# Patient Record
Sex: FEMALE | Race: WHITE | NOT HISPANIC OR LATINO
[De-identification: names, ages, dates, MRNs, and addresses within clinical notes are randomized per-mention and may not be internally consistent; named-entity substitution may affect disease eponyms.]

---

## 2017-10-01 ENCOUNTER — HEALTH MAINTENANCE LETTER (OUTPATIENT)
Age: 32
End: 2017-10-01

## 2021-09-15 LAB — HIV 1&2 EXT: NORMAL

## 2021-09-17 LAB
HEP C HIM: NORMAL
HPV ABSTRACT: NORMAL
PAP-ABSTRACT: NORMAL

## 2023-02-16 ASSESSMENT — ENCOUNTER SYMPTOMS
CHILLS: 0
HEMATURIA: 0
JOINT SWELLING: 0
COUGH: 0
WEAKNESS: 0
FREQUENCY: 1
NERVOUS/ANXIOUS: 0
PARESTHESIAS: 0
MYALGIAS: 0
DYSURIA: 0
HEMATOCHEZIA: 0
CONSTIPATION: 0
ARTHRALGIAS: 0
DIZZINESS: 0
PALPITATIONS: 0
HEADACHES: 0
FEVER: 0
BREAST MASS: 0
SORE THROAT: 0
ABDOMINAL PAIN: 0
NAUSEA: 0
SHORTNESS OF BREATH: 0
HEARTBURN: 0
EYE PAIN: 0
DIARRHEA: 0

## 2023-02-16 ASSESSMENT — ANXIETY QUESTIONNAIRES
GAD7 TOTAL SCORE: 5
3. WORRYING TOO MUCH ABOUT DIFFERENT THINGS: MORE THAN HALF THE DAYS
7. FEELING AFRAID AS IF SOMETHING AWFUL MIGHT HAPPEN: NOT AT ALL
6. BECOMING EASILY ANNOYED OR IRRITABLE: NOT AT ALL
8. IF YOU CHECKED OFF ANY PROBLEMS, HOW DIFFICULT HAVE THESE MADE IT FOR YOU TO DO YOUR WORK, TAKE CARE OF THINGS AT HOME, OR GET ALONG WITH OTHER PEOPLE?: SOMEWHAT DIFFICULT
GAD7 TOTAL SCORE: 5
GAD7 TOTAL SCORE: 5
IF YOU CHECKED OFF ANY PROBLEMS ON THIS QUESTIONNAIRE, HOW DIFFICULT HAVE THESE PROBLEMS MADE IT FOR YOU TO DO YOUR WORK, TAKE CARE OF THINGS AT HOME, OR GET ALONG WITH OTHER PEOPLE: SOMEWHAT DIFFICULT
1. FEELING NERVOUS, ANXIOUS, OR ON EDGE: NOT AT ALL
7. FEELING AFRAID AS IF SOMETHING AWFUL MIGHT HAPPEN: NOT AT ALL
4. TROUBLE RELAXING: SEVERAL DAYS
2. NOT BEING ABLE TO STOP OR CONTROL WORRYING: MORE THAN HALF THE DAYS
5. BEING SO RESTLESS THAT IT IS HARD TO SIT STILL: NOT AT ALL

## 2023-02-16 ASSESSMENT — PATIENT HEALTH QUESTIONNAIRE - PHQ9
SUM OF ALL RESPONSES TO PHQ QUESTIONS 1-9: 6
10. IF YOU CHECKED OFF ANY PROBLEMS, HOW DIFFICULT HAVE THESE PROBLEMS MADE IT FOR YOU TO DO YOUR WORK, TAKE CARE OF THINGS AT HOME, OR GET ALONG WITH OTHER PEOPLE: SOMEWHAT DIFFICULT
SUM OF ALL RESPONSES TO PHQ QUESTIONS 1-9: 6

## 2023-02-16 NOTE — PATIENT INSTRUCTIONS
Good to see you today!    We will draw lab tests today. I will contact you in the next 2-3 business days with the results of these labs.    INCREASE sertraline to 75 mg (1.5 tablets) per day  Follow-up with me in 1 month for medication check (phone video OK)    Preventive Health Recommendations  Female Ages 26 - 39  Yearly exam:   See your health care provider every year in order to  Review health changes.   Discuss preventive care.    Review your medicines if you your doctor has prescribed any.    Until age 30: Get a Pap test every three years (more often if you have had an abnormal result).    After age 30: Talk to your doctor about whether you should have a Pap test every 3 years or have a Pap test with HPV screening every 5 years.   You do not need a Pap test if your uterus was removed (hysterectomy) and you have not had cancer.  You should be tested each year for STDs (sexually transmitted diseases), if you're at risk.   Talk to your provider about how often to have your cholesterol checked.  If you are at risk for diabetes, you should have a diabetes test (fasting glucose).  Shots: Get a flu shot each year. Get a tetanus shot every 10 years.   Nutrition:   Eat at least 5 servings of fruits and vegetables each day.  Eat whole-grain bread, whole-wheat pasta and brown rice instead of white grains and rice.  Get adequate Calcium and Vitamin D.     Lifestyle  Exercise at least 150 minutes a week (30 minutes a day, 5 days of the week). This will help you control your weight and prevent disease.  Limit alcohol to one drink per day.  No smoking.   Wear sunscreen to prevent skin cancer.  See your dentist every six months for an exam and cleaning.

## 2023-02-17 ENCOUNTER — OFFICE VISIT (OUTPATIENT)
Dept: FAMILY MEDICINE | Facility: CLINIC | Age: 38
End: 2023-02-17
Payer: COMMERCIAL

## 2023-02-17 VITALS
OXYGEN SATURATION: 97 % | TEMPERATURE: 97.9 F | BODY MASS INDEX: 30.05 KG/M2 | RESPIRATION RATE: 18 BRPM | HEART RATE: 100 BPM | DIASTOLIC BLOOD PRESSURE: 75 MMHG | WEIGHT: 176 LBS | SYSTOLIC BLOOD PRESSURE: 118 MMHG | HEIGHT: 64 IN

## 2023-02-17 DIAGNOSIS — Z13.88 SCREENING EXAMINATION FOR LEAD POISONING: ICD-10-CM

## 2023-02-17 DIAGNOSIS — Z00.00 ROUTINE GENERAL MEDICAL EXAMINATION AT A HEALTH CARE FACILITY: Primary | ICD-10-CM

## 2023-02-17 DIAGNOSIS — F41.8 POSTPARTUM ANXIETY: ICD-10-CM

## 2023-02-17 DIAGNOSIS — Z13.1 SCREENING FOR DIABETES MELLITUS: ICD-10-CM

## 2023-02-17 LAB — HBA1C MFR BLD: 4.9 % (ref 0–5.6)

## 2023-02-17 PROCEDURE — 83655 ASSAY OF LEAD: CPT | Performed by: FAMILY MEDICINE

## 2023-02-17 RX ORDER — FLUTICASONE PROPIONATE 50 MCG
2 SPRAY, SUSPENSION (ML) NASAL
COMMUNITY
Start: 2023-01-30 | End: 2023-03-17

## 2023-02-17 RX ORDER — CHLORAL HYDRATE 500 MG
2 CAPSULE ORAL DAILY
COMMUNITY

## 2023-02-17 NOTE — NURSING NOTE
"37 year old  Chief Complaint   Patient presents with     Physical     Recheck Medication       Blood pressure 118/75, pulse 100, temperature 97.9  F (36.6  C), temperature source Temporal, resp. rate 18, height 1.613 m (5' 3.5\"), weight 79.8 kg (176 lb), last menstrual period 01/27/2023, SpO2 97 %. Body mass index is 30.69 kg/m .  There is no problem list on file for this patient.      Wt Readings from Last 2 Encounters:   02/17/23 79.8 kg (176 lb)   01/23/13 62.1 kg (137 lb)     BP Readings from Last 3 Encounters:   02/17/23 118/75   01/23/13 118/77         Current Outpatient Medications   Medication     fluticasone (FLONASE) 50 MCG/ACT nasal spray     sertraline (ZOLOFT) 50 MG tablet     No current facility-administered medications for this visit.       Social History     Tobacco Use     Smoking status: Never   Substance Use Topics     Alcohol use: Yes     Comment:  3-6 glasses of beer per week.     Drug use: No       Health Maintenance Due   Topic Date Due     ADVANCE CARE PLANNING  Never done       No results found for: PAP      February 17, 2023 8:41 AM    "

## 2023-02-17 NOTE — PROGRESS NOTES
"CC: Physical and Recheck Medication      Michelle is a 37 year old female who presents to clinic for an annual exam.       New concerns:  Start of sertraline was pretty smooth  Previously took fluoxetine and had a lot of problems with starting this  Feels like it is helping a lot with the depressed feelings  Still having anxiety at times.  Would like to try increasing dose    Struggling a lot with body image following delivery \"this is the highest number I've ever seen\"  Working on body acceptance but acknowledges this is hard  Does feel that libido has been very low    Chronic health conditions:  Patient Active Problem List   Diagnosis     Postpartum anxiety       We reviewed and updated her medication list. Her past medical and surgical history as well as her family history were reviewed and updated as necessary.    Gynecological history:  Menstrual/PMS/menopausal symptoms: irregular (nursing)  Last Pap:  9/15/2021, result Normal  History of abnormal Paps: no  Concern for STIs: no  Safety: Feels safe in relationship  Would you like to become pregnant in the next year? no Contraceptive method: vasectomy  OB history:   Breast cancer screening: n/a    Other health-related habits:  Nutrition: varied and balanced  Physical activity: walking 3+ miles per day  Tobacco: None    Alcohol: Occassional  Drug use: no   Mood: see above  Dental: Due  Vaccines: UTD  Hep C & HIV screening: UTD  DM screening: ordered today  Lipids: deferred due to breastfeeding  Colon cancer screening: n/a (MGM > 60 years old at diagnosis, mom with normal screening. Plan for screening at 43 y/o)      OBJECTIVE:   /75 (BP Location: Left arm, Patient Position: Sitting, Cuff Size: Adult Large)   Pulse 100   Temp 97.9  F (36.6  C) (Temporal)   Resp 18   Ht 1.613 m (5' 3.5\")   Wt 79.8 kg (176 lb)   LMP 01/27/2023 (Approximate)   SpO2 97%   BMI 30.69 kg/m     General: Healthy female in NAD.  Cooperative and pleasant.  HEENT: " Normocephalic, atraumatic. Oropharynx moist without lesions or exudate.  Supple neck, without lymphadenopathy or thyromegaly.    Lungs: CTA bilaterally.  CV: RRR, normal S1 and S2, without murmurs, rubs, or gallops appreciated.    Abdomen: Soft, NT, ND.  No masses or hepatosplenomegaly appreciated.    Extremities: WWW, without deformity, edema.  Skin: Clear without lesions or rashes.   Neuro: Grossly intact, nonfocal.  Psych: Mood and affect appropriate.      ASSESSMENT AND PLAN:   Michelle was seen today for physical and recheck medication.    Diagnoses and all orders for this visit:    Routine general medical examination at a health care facility    Screening for diabetes mellitus  -     Hemoglobin A1c; Future    Screening examination for lead poisoning  -     Lead Venous Blood Confirm; Future    Postpartum anxiety  -     sertraline (ZOLOFT) 50 MG tablet; Take 1.5 tablets (75 mg) by mouth daily      Increase sertraline to 75 mg daily    Follow-up: 1 month medication check    Sherine Villarreal MD/[facility]  Family Medicine

## 2023-02-19 LAB — LEAD BLDV-MCNC: <2 UG/DL

## 2023-03-16 ASSESSMENT — ANXIETY QUESTIONNAIRES
GAD7 TOTAL SCORE: 3
2. NOT BEING ABLE TO STOP OR CONTROL WORRYING: SEVERAL DAYS
8. IF YOU CHECKED OFF ANY PROBLEMS, HOW DIFFICULT HAVE THESE MADE IT FOR YOU TO DO YOUR WORK, TAKE CARE OF THINGS AT HOME, OR GET ALONG WITH OTHER PEOPLE?: SOMEWHAT DIFFICULT
7. FEELING AFRAID AS IF SOMETHING AWFUL MIGHT HAPPEN: NOT AT ALL
4. TROUBLE RELAXING: NOT AT ALL
6. BECOMING EASILY ANNOYED OR IRRITABLE: NOT AT ALL
3. WORRYING TOO MUCH ABOUT DIFFERENT THINGS: SEVERAL DAYS
7. FEELING AFRAID AS IF SOMETHING AWFUL MIGHT HAPPEN: NOT AT ALL
GAD7 TOTAL SCORE: 3
IF YOU CHECKED OFF ANY PROBLEMS ON THIS QUESTIONNAIRE, HOW DIFFICULT HAVE THESE PROBLEMS MADE IT FOR YOU TO DO YOUR WORK, TAKE CARE OF THINGS AT HOME, OR GET ALONG WITH OTHER PEOPLE: SOMEWHAT DIFFICULT
GAD7 TOTAL SCORE: 3
5. BEING SO RESTLESS THAT IT IS HARD TO SIT STILL: NOT AT ALL
1. FEELING NERVOUS, ANXIOUS, OR ON EDGE: SEVERAL DAYS

## 2023-03-16 ASSESSMENT — PATIENT HEALTH QUESTIONNAIRE - PHQ9
SUM OF ALL RESPONSES TO PHQ QUESTIONS 1-9: 3
SUM OF ALL RESPONSES TO PHQ QUESTIONS 1-9: 3
10. IF YOU CHECKED OFF ANY PROBLEMS, HOW DIFFICULT HAVE THESE PROBLEMS MADE IT FOR YOU TO DO YOUR WORK, TAKE CARE OF THINGS AT HOME, OR GET ALONG WITH OTHER PEOPLE: SOMEWHAT DIFFICULT

## 2023-03-17 ENCOUNTER — VIRTUAL VISIT (OUTPATIENT)
Dept: FAMILY MEDICINE | Facility: CLINIC | Age: 38
End: 2023-03-17
Payer: COMMERCIAL

## 2023-03-17 DIAGNOSIS — R68.82 LOW LIBIDO: ICD-10-CM

## 2023-03-17 DIAGNOSIS — F41.8 POSTPARTUM ANXIETY: Primary | ICD-10-CM

## 2023-03-17 NOTE — NURSING NOTE
37 year old  Chief Complaint   Patient presents with     Recheck Medication     Sertraline        Last menstrual period 2023. There is no height or weight on file to calculate BMI.  Patient Active Problem List   Diagnosis     Postpartum anxiety       Wt Readings from Last 2 Encounters:   23 79.8 kg (176 lb)   13 62.1 kg (137 lb)     BP Readings from Last 3 Encounters:   23 118/75   13 118/77         Current Outpatient Medications   Medication     fish oil-omega-3 fatty acids 1000 MG capsule     Prenatal Multivit-Min-Fe-FA (JENLIVA PRENATAL/ PO)     sertraline (ZOLOFT) 50 MG tablet     fluticasone (FLONASE) 50 MCG/ACT nasal spray     No current facility-administered medications for this visit.       Social History     Tobacco Use     Smoking status: Never     Smokeless tobacco: Never   Substance Use Topics     Alcohol use: Yes     Comment:  3-6 glasses of beer per week.     Drug use: No       Health Maintenance Due   Topic Date Due     ADVANCE CARE PLANNING  Never done       No results found for: PAP      2023 9:09 AM

## 2023-03-17 NOTE — PROGRESS NOTES
"Michelle is a 37 year old who is being evaluated via a billable video visit.      How would you like to obtain your AVS? MyChart  If the video visit is dropped, the invitation should be resent by: Text to cell phone: [phone]  Will anyone else be joining your video visit? No          Assessment & Plan     Postpartum anxiety  Continue sertraline 75 mg  Follow-up in 6 months for medication check, sooner with concerns    Low libido  Consider [facility] consult  - [facility]  Referral; Future    Sherine Villarreal MD  [facility]    Subjective   Michelle is a 37 year old, presenting for the following health issues:  Recheck Medication (Sertraline )    Increased sertraline from 50 mg to 75 mg at our last visit  Feels like it has been really helpful  Feels like her worries or concerns are more \"normal\" - (e.g. does Mac need to drop a nap?)  No side effects with the dose change  No significant sadness/depression  Has found when she does have things that upset her, she is able to process this information much more quickly and easily than she has in the past    Is working with therapist, doing couples counseling with 's therapist  May be interested in doing therapy specifically surrounding sexual health in the future given persistent low libido since delivery.      PHQ 2/16/2023 3/16/2023   PHQ-9 Total Score 6 3   Q9: Thoughts of better off dead/self-harm past 2 weeks Not at all Not at all     CODY-7 SCORE 2/16/2023 3/16/2023   Total Score 5 (mild anxiety) 3 (minimal anxiety)   Total Score 5 3           Objective           Vitals:  No vitals were obtained today due to virtual visit.    Physical Exam   GENERAL: Healthy, alert and no distress  EYES: Eyes grossly normal to inspection.  No discharge or erythema, or obvious scleral/conjunctival abnormalities.  RESP: No audible wheeze, cough, or visible cyanosis.  No visible retractions or increased work of breathing.    SKIN: Visible " skin clear. No significant rash, abnormal pigmentation or lesions.  NEURO: Cranial nerves grossly intact.  Mentation and speech appropriate for age.  PSYCH: Mentation appears normal, affect normal/bright, judgement and insight intact, normal speech and appearance well-groomed.        Video-Visit Details    Type of service:  Video Visit     Originating Location (pt. Location): Home  Distant Location (provider location):  On-site  Platform used for Video Visit: Acustream    Answers for HPI/ROS submitted by the patient on 3/16/2023  If you checked off any problems, how difficult have these problems made it for you to do your work, take care of things at home, or get along with other people?: Somewhat difficult  PHQ9 TOTAL SCORE: 3  CODY 7 TOTAL SCORE: 3

## 2023-03-17 NOTE — PATIENT INSTRUCTIONS
Good to see you today!    Continue sertraline 75 mg per day. Follow-up with me in 6 months for medication check, sooner with concerns.    Look into the [facility] for therapy/counseling services specifically related to sexual health and low libido. It is run through the [facility] ([email or web address])

## 2023-03-30 ENCOUNTER — OFFICE VISIT (OUTPATIENT)
Dept: FAMILY MEDICINE | Facility: CLINIC | Age: 38
End: 2023-03-30
Payer: COMMERCIAL

## 2023-03-30 VITALS
TEMPERATURE: 98.3 F | RESPIRATION RATE: 18 BRPM | HEART RATE: 112 BPM | SYSTOLIC BLOOD PRESSURE: 142 MMHG | OXYGEN SATURATION: 97 % | DIASTOLIC BLOOD PRESSURE: 88 MMHG

## 2023-03-30 DIAGNOSIS — L30.8 OTHER ECZEMA: Primary | ICD-10-CM

## 2023-03-30 RX ORDER — TACROLIMUS 1 MG/G
OINTMENT TOPICAL 2 TIMES DAILY
Qty: 30 G | Refills: 1 | Status: SHIPPED | OUTPATIENT
Start: 2023-03-30 | End: 2023-06-29 | Stop reason: SINTOL

## 2023-03-30 NOTE — NURSING NOTE
37 year old  Chief Complaint   Patient presents with     Derm Problem     Itchy spots around eyes bilaterally -- under bottom lash line and to edges of eyes. Has been using shea butter, eucerin eczema cream made it worse. Spots started showing around video visit 3/17, have shown up before in the past and goes away. Worst within an hour of waking up and will be bad all day, itching and burning.       Blood pressure (!) 142/88, pulse 112, temperature 98.3  F (36.8  C), temperature source Temporal, resp. rate 18, last menstrual period 2023, SpO2 97 %. There is no height or weight on file to calculate BMI.  Patient Active Problem List   Diagnosis     Postpartum anxiety       Wt Readings from Last 2 Encounters:   23 79.8 kg (176 lb)   13 62.1 kg (137 lb)     BP Readings from Last 3 Encounters:   23 (!) 142/88   23 118/75   13 118/77         Current Outpatient Medications   Medication     fish oil-omega-3 fatty acids 1000 MG capsule     Prenatal Multivit-Min-Fe-FA (JENLIVA PRENATAL/ PO)     sertraline (ZOLOFT) 50 MG tablet     No current facility-administered medications for this visit.       Social History     Tobacco Use     Smoking status: Never     Smokeless tobacco: Never   Substance Use Topics     Alcohol use: Yes     Comment:  3-6 glasses of beer per week.     Drug use: No       Health Maintenance Due   Topic Date Due     ADVANCE CARE PLANNING  Never done       No results found for: PAP      2023 9:57 AM

## 2023-03-30 NOTE — PROGRESS NOTES
CC: Derm Problem (Itchy spots around eyes bilaterally -- under bottom lash line and to edges of eyes. Has been using shea butter, eucerin eczema cream made it worse. Spots started showing around video visit 3/17, have shown up before in the past and goes away. Worst within an hour of waking up and will be bad all day, itching and burning.)        ASSESSMENT/PLAN: 36 y/o F with a history of intermittent eczema-like rash presenting today with periocular eczema. EOMI and no evidence of cellulitis. Will treat with tacrolimus + antihistamines, return precautions reviewed. Discussed avoidance of triggers. Patient in agreement with plan.    Michelle was seen today for derm problem.    Diagnoses and all orders for this visit:    Other eczema  -     tacrolimus (PROTOPIC) 0.1 % external ointment; Apply topically 2 times daily        Worrisome signs and symptoms were discussed with Michelle and she was instructed to return to the clinic for concerning symptoms or to call with questions. All patient questions answered.    Follow up: NICHOLAS Villarreal MD/[facility]  Family Medicine      SUBJECTIVE: Michelle is a 37 year old female who comes in with the following concerns:    Rash around eyelids  Started about 2 weeks ago  No new face creams, eye lotions, detergents  Seems to be better first thing in the morning (after lotion on and no scratching all night). Gets worse within the first hour of waking her up and bothersome all day.    Has had similar before, usually improves with lotion and is on R lower lid only. This time affecting R upper and lower lid and L lower lid and not responding to lotion.    Using Cid's shea butter which seems to help some. Eucerin eczema cream was too irritating/made it worse.    Gets rare, sporadic eczema otherwise (occasionally some on her hip, resolves with lotion)  Does have seasonal allergies, usually manages but eating local honey daily. Has not started this for this season yet.    No changes in  visual acuity    OBJECTIVE:   BP (!) 142/88 (BP Location: Left arm, Patient Position: Sitting, Cuff Size: Adult Large)   Pulse 112   Temp 98.3  F (36.8  C) (Temporal)   Resp 18   LMP 01/27/2023 (Approximate)   SpO2 97%   General: Well-appearing in NAD   HEENT: Normocephalic, atraumatic. Mucus membranes moist. EOMI without pain   Resp: No respiratory distress   MSK: No peripheral edema.   Skin: Papules present on R upper and lower lid, L lower lid. No significant scale/plaques and no erythema/induration/warmth. Slight edema to lower lids bilaterally  Psych: Appropriate affect. Mood is good

## 2023-03-30 NOTE — PATIENT INSTRUCTIONS
Use tacrolimus ointment twice daily (morning and night) - prescription   Use for up to 2 weeks and then as needed.    Use shea butter ointment for times when additional moisture is needed    Avoid wiping, rubbing and washing more than once per day. Use gentle soaps (or no soap) if at all.    Would also add in allergy treatment, either restarting honey or oral antihistamine for the next 2-3 weeks.

## 2023-06-29 ENCOUNTER — OFFICE VISIT (OUTPATIENT)
Dept: FAMILY MEDICINE | Facility: CLINIC | Age: 38
End: 2023-06-29
Payer: COMMERCIAL

## 2023-06-29 VITALS
HEART RATE: 78 BPM | SYSTOLIC BLOOD PRESSURE: 114 MMHG | TEMPERATURE: 97.8 F | DIASTOLIC BLOOD PRESSURE: 76 MMHG | OXYGEN SATURATION: 96 %

## 2023-06-29 DIAGNOSIS — L21.9 SEBORRHEIC DERMATITIS: Primary | ICD-10-CM

## 2023-06-29 RX ORDER — KETOCONAZOLE 20 MG/ML
SHAMPOO TOPICAL DAILY PRN
Qty: 120 ML | Refills: 0 | Status: SHIPPED | OUTPATIENT
Start: 2023-06-29 | End: 2024-02-13

## 2023-06-29 RX ORDER — DESONIDE 0.5 MG/G
CREAM TOPICAL DAILY PRN
Qty: 15 G | Refills: 0 | Status: SHIPPED | OUTPATIENT
Start: 2023-06-29

## 2023-06-29 NOTE — PROGRESS NOTES
CC: Derm Problem (Pt reports a continuous rash on both her eyelids)      Subjective: Michelle Vivas is a 38 year old female who presents for evaluation of a rash on both eyelids.  She notes inflamed bumpy spots on eyelids, comes and goes. Started on the right eye on the eyelid and now both eyes and into the eyebrows and forehead. Pruritic. Since March. Saw Dr. Villarreal, started on tacrolimus topical, but she stopped tacrolimus because it was hurting. Tried it for 1 month. Burning when she used it.     On the face she is using mineral sunscreen. And palmers cocoa butter formula. No eye makeup. No new face creams, eye lotions, detergents.     Gets rare, sporadic eczema otherwise. No scalp itchiness or rash.       Objective:  /76   Pulse 78   Temp 97.8  F (36.6  C)   SpO2 96%   General: Patient appears healthy, alert, and in no distress.  Skin: Small papules on right upper lid, some scale along bilateral eyebrows. No erythema, induration, warmth, or edema.     Assessment:  1) Rash, possible seborrheic dermatitis vs eczema    Plan:   1) Patient was provided with prescription of topical ketoconazole shampoo to be applied to affected areas twice weekly. Also suggested topical desonide as needed for days with flare of symptoms. Dosing and possible adverse effects of medication discussed. Discussed limited use of topical steroid d/t adverse effects. RTC prn if no improvement with above.

## 2023-06-29 NOTE — NURSING NOTE
38 year old  Chief Complaint   Patient presents with     Derm Problem     Pt reports a continuous rash on both her eyelids       Blood pressure 114/76, pulse 78, temperature 97.8  F (36.6  C), SpO2 96 %. There is no height or weight on file to calculate BMI.  Patient Active Problem List   Diagnosis     Postpartum anxiety       Wt Readings from Last 2 Encounters:   23 79.8 kg (176 lb)   13 62.1 kg (137 lb)     BP Readings from Last 3 Encounters:   23 114/76   23 (!) 142/88   23 118/75         Current Outpatient Medications   Medication     fish oil-omega-3 fatty acids 1000 MG capsule     Prenatal Multivit-Min-Fe-FA (JENLIVA PRENATAL/ PO)     sertraline (ZOLOFT) 50 MG tablet     tacrolimus (PROTOPIC) 0.1 % external ointment     No current facility-administered medications for this visit.       Social History     Tobacco Use     Smoking status: Never     Smokeless tobacco: Never   Substance Use Topics     Alcohol use: Yes     Comment:  3-6 glasses of beer per week.     Drug use: No       Health Maintenance Due   Topic Date Due     ADVANCE CARE PLANNING  Never done       No results found for: PAP      2023 10:02 AM

## 2023-09-01 ENCOUNTER — OFFICE VISIT (OUTPATIENT)
Dept: FAMILY MEDICINE | Facility: CLINIC | Age: 38
End: 2023-09-01
Payer: COMMERCIAL

## 2023-09-01 VITALS
OXYGEN SATURATION: 99 % | HEART RATE: 84 BPM | RESPIRATION RATE: 17 BRPM | TEMPERATURE: 97.1 F | BODY MASS INDEX: 32.52 KG/M2 | WEIGHT: 186.5 LBS | DIASTOLIC BLOOD PRESSURE: 80 MMHG | SYSTOLIC BLOOD PRESSURE: 115 MMHG

## 2023-09-01 DIAGNOSIS — F41.8 POSTPARTUM ANXIETY: ICD-10-CM

## 2023-09-01 DIAGNOSIS — R68.89 HEAT INTOLERANCE: Primary | ICD-10-CM

## 2023-09-01 LAB
HBA1C MFR BLD: 5.1 % (ref 0–5.6)
TSH SERPL DL<=0.005 MIU/L-ACNC: 1.69 UIU/ML (ref 0.3–4.2)

## 2023-09-01 PROCEDURE — 84443 ASSAY THYROID STIM HORMONE: CPT | Performed by: FAMILY MEDICINE

## 2023-09-01 ASSESSMENT — ANXIETY QUESTIONNAIRES
5. BEING SO RESTLESS THAT IT IS HARD TO SIT STILL: NOT AT ALL
1. FEELING NERVOUS, ANXIOUS, OR ON EDGE: SEVERAL DAYS
GAD7 TOTAL SCORE: 6
7. FEELING AFRAID AS IF SOMETHING AWFUL MIGHT HAPPEN: NOT AT ALL
IF YOU CHECKED OFF ANY PROBLEMS ON THIS QUESTIONNAIRE, HOW DIFFICULT HAVE THESE PROBLEMS MADE IT FOR YOU TO DO YOUR WORK, TAKE CARE OF THINGS AT HOME, OR GET ALONG WITH OTHER PEOPLE: NOT DIFFICULT AT ALL
GAD7 TOTAL SCORE: 6
2. NOT BEING ABLE TO STOP OR CONTROL WORRYING: MORE THAN HALF THE DAYS
6. BECOMING EASILY ANNOYED OR IRRITABLE: NOT AT ALL
3. WORRYING TOO MUCH ABOUT DIFFERENT THINGS: MORE THAN HALF THE DAYS

## 2023-09-01 ASSESSMENT — PATIENT HEALTH QUESTIONNAIRE - PHQ9
5. POOR APPETITE OR OVEREATING: SEVERAL DAYS
SUM OF ALL RESPONSES TO PHQ QUESTIONS 1-9: 4

## 2023-09-01 NOTE — PROGRESS NOTES
CC: Recheck Medication and Follow Up (Thyroid/hormone/diabetes testing. Family hx of diabetes, and fam hx of thyroid issues.)        ASSESSMENT/PLAN: 38 year old female with postpartum depression/anxiety. Also reporting heat intolerance and increased sweating. Will check TSH, A1c today. Hyperhidrosis is potential side effect of selective serotonin reuptake inhibitor and this was reviewed with patient today. She has already decreased her dose to 50 mg per day. Will continue to monitor.    Michelle was seen today for recheck medication and follow up.    Diagnoses and all orders for this visit:    Heat intolerance  -     Hemoglobin A1c; Future  -     TSH with free T4 reflex; Future  -     Hemoglobin A1c  -     TSH with free T4 reflex    Postpartum anxiety  -     sertraline (ZOLOFT) 50 MG tablet; Take 1 tablet (50 mg) by mouth daily        Worrisome signs and symptoms were discussed with Michelle and she was instructed to return to the clinic for concerning symptoms or to call with questions. All patient questions answered.    Follow up: 6 months    Sherine Villarreal MD/[facility]  Family Medicine      SUBJECTIVE: Michelle is a 38 year old female who comes in with the following concerns:    Noticing heat intolerance  She has always felt like she has run on the warm side, this has been more pronounced since having her child.  Noticing she is sweating significantly with any minimal activity, sometimes after eating as well  Wondering about hormonal cause  Has been working to wean - currently only nursing once per day, but not every day. Does breast sleep at night    Decreased sertraline from 75 mg to 50 mg  Forgot it when she went on a trip, went back to 50 mg and has been there for 1-1.5 weeks  Overall feels like anxiety continues to be stable  Most stress is with her relationship right now. Seeing therapy, reaching out to friends    Had a milk bleb about a week ago, was able to get it released by feeding. Still having occasional deep,  shooting pain in that breast. No residual nipple pain/trauma      OBJECTIVE:   /80 (BP Location: Left arm, Patient Position: Sitting, Cuff Size: Adult Large)   Pulse 84   Temp 97.1  F (36.2  C) (Temporal)   Resp 17   Wt 84.6 kg (186 lb 8 oz)   SpO2 99%   BMI 32.52 kg/m    General: Well-appearing in NAD   HEENT: Normocephalic, atraumatic. Mucus membranes moist.   Resp: No respiratory distress   MSK: No peripheral edema.   Skin: No rashes or lesions noted.   Psych: Appropriate affect.

## 2023-09-01 NOTE — NURSING NOTE
38 year old  Chief Complaint   Patient presents with    Recheck Medication    Follow Up     Thyroid/hormone/diabetes testing. Family hx of diabetes, and fam hx of thyroid issues.       Blood pressure 115/80, pulse 84, temperature 97.1  F (36.2  C), temperature source Temporal, resp. rate 17, weight 84.6 kg (186 lb 8 oz), SpO2 99 %. Body mass index is 32.52 kg/m .  Patient Active Problem List   Diagnosis    Postpartum anxiety       Wt Readings from Last 2 Encounters:   23 84.6 kg (186 lb 8 oz)   23 79.8 kg (176 lb)     BP Readings from Last 3 Encounters:   23 115/80   23 114/76   23 (!) 142/88         Current Outpatient Medications   Medication    desonide (DESOWEN) 0.05 % external cream    fish oil-omega-3 fatty acids 1000 MG capsule    ketoconazole (NIZORAL) 2 % external shampoo    Prenatal Multivit-Min-Fe-FA (JENLIVA PRENATAL/ PO)    sertraline (ZOLOFT) 50 MG tablet     No current facility-administered medications for this visit.       Social History     Tobacco Use    Smoking status: Never    Smokeless tobacco: Never   Substance Use Topics    Alcohol use: Yes     Comment:  3-6 glasses of beer per week.    Drug use: No       Health Maintenance Due   Topic Date Due    ADVANCE CARE PLANNING  Never done    INFLUENZA VACCINE (1) 2023       No results found for: PAP      2023 9:38 AM

## 2023-09-01 NOTE — PATIENT INSTRUCTIONS
Continue sertraline 50 mg per day  Let me know if anxiety is worsening.    We will draw lab tests today. I will contact you in the next 2-3 business days with the results of these labs.     Resources for Adult Autism Disorder testing    -[facility]    -[facility]    -[facility] Adult [facility] Referral--under this order you can select psychological evaluation and then clarify, seeking Adult Autism Spectrum Disorder Testing.

## 2023-11-01 ENCOUNTER — MYC MEDICAL ADVICE (OUTPATIENT)
Dept: FAMILY MEDICINE | Facility: CLINIC | Age: 38
End: 2023-11-01

## 2023-11-07 ENCOUNTER — ALLIED HEALTH/NURSE VISIT (OUTPATIENT)
Dept: FAMILY MEDICINE | Facility: CLINIC | Age: 38
End: 2023-11-07
Payer: COMMERCIAL

## 2023-11-07 DIAGNOSIS — Z23 NEEDS FLU SHOT: Primary | ICD-10-CM

## 2023-11-07 NOTE — PROGRESS NOTES
"  Injectable Influenza Immunization Documentation    1.  Has the patient received the information for the injectable influenza vaccine? {YES/NO (DEFAULT IS YES):683042::YES}     2. Is the patient 6 months of age or older? {YES/NO (DEFAULT IS YES):860814::YES}     3. Does the patient have any of the following contraindications?         Severe allergy to eggs? {YES/NO DEFAULT NO:51196:: No}     Severe allergic reaction to previous influenza vaccines? {YES/NO DEFAULT     NO:40154:: No}   Severe allergy to latex? {YES/NO DEFAULT NO:84514:: No}       History of Guillain-[place] syndrome? {YES/NO DEFAULT NO:49643:: No}     Currently have a temperature greater than 100.4F? {YES/NO DEFAULT NO:14296:: No}        4.  Severely egg allergic patients should have flu vaccine eligibility assessed by an MD, RN, or pharmacist, and those who received flu vaccine should be observed for 15 min by an MD, RN, Pharmacist, Medical Technician, or member of clinic staff.\": {YES/NO (DEFAULT IS YES):602195::YES}    5. Latex-allergic patients should be given latex-free influenza vaccine {Yes/No:847587}. Please reference the Vaccine latex table to determine if your clinic s product is latex-containing.       Vaccination given by ***      "

## 2023-11-07 NOTE — NURSING NOTE
"Injectable Influenza Immunization Documentation    1.  Has the patient received the information for the injectable influenza vaccine? YES     2. Is the patient 6 months of age or older? YES     3. Does the patient have any of the following contraindications?         Severe allergy to eggs? No     Severe allergic reaction to previous influenza vaccines? No   Severe allergy to latex? No       History of Guillain-[place] syndrome? No     Currently have a temperature greater than 100.4F? No        4.  Severely egg allergic patients should have flu vaccine eligibility assessed by an MD, RN, or pharmacist, and those who received flu vaccine should be observed for 15 min by an MD, RN, Pharmacist, Medical Technician, or member of clinic staff.\": YES    5. Latex-allergic patients should be given latex-free influenza vaccine Yes. Please reference the Vaccine latex table to determine if your clinic s product is latex-containing.       Vaccination given by Bea Rose CMA on 11/7/2023 at 10:04 AM      "

## 2024-02-13 ENCOUNTER — OFFICE VISIT (OUTPATIENT)
Dept: FAMILY MEDICINE | Facility: CLINIC | Age: 39
End: 2024-02-13
Payer: COMMERCIAL

## 2024-02-13 VITALS
OXYGEN SATURATION: 96 % | HEART RATE: 96 BPM | TEMPERATURE: 98.3 F | DIASTOLIC BLOOD PRESSURE: 78 MMHG | SYSTOLIC BLOOD PRESSURE: 127 MMHG

## 2024-02-13 DIAGNOSIS — K64.4 EXTERNAL HEMORRHOIDS: ICD-10-CM

## 2024-02-13 DIAGNOSIS — K60.2 ANAL FISSURE: Primary | ICD-10-CM

## 2024-02-13 NOTE — PROGRESS NOTES
CC: Rectal Problem (Hemorrhoid discussion)      ASSESSMENT/PLAN: 37 y/o F presenting with rectal pain with bowel movements. Exam today with external hemorrhoid and anal fissure. Prescribe topical nifedipine ointment to compounding pharmacy. Discussed supportive cares. If no improvement, would recommend evaluation by colorectal surgery. Patient in agreement with plan.    Michelle was seen today for rectal problem.    Diagnoses and all orders for this visit:    Anal fissure  -     nifedipine 0.2% in white petrolatum 0.2 % OINT ointment; Apply topically 2 times daily    External hemorrhoids        Worrisome signs and symptoms were discussed with Michelle and she was instructed to return to the clinic for concerning symptoms or to call with questions. All patient questions answered.    Follow up: 1-2 months for physical    Sherine Villarreal MD/MPH  Family Medicine      SUBJECTIVE: Michelle is a 38 year old female who comes in with the following concerns:    Hemorrhoids. Usually starts with period  Did not have during pregnancy  Did not have right away postpartum, but have had since menstruating    External, able to feel them  +pain, usually not itchy  Heals overnights - then gets worse after a bowel movement    Using Tucks, and using a spray  Preparation H sprays  Applying 1-2 times per day.    Aching, discomfort. No sharp/severe pain    Bowel movements - stools do get a little looser with periods  Has pain with bowel movements. Doesn't want to have a bowel movement due to the pain  Has a high fiber diet  Usually regular, no straining, no difficulty with hard stools.    Rarely bleeds  Using a bidet after using the bathroom    OBJECTIVE:   /78 (BP Location: Left arm, Patient Position: Sitting, Cuff Size: Adult Regular)   Pulse 96   Temp 98.3  F (36.8  C) (Skin)   LMP 01/24/2024 (Approximate)   SpO2 96%   General: Well-appearing in NAD   HEENT: Normocephalic, atraumatic. Mucus membranes moist.   Resp: No respiratory  distress   MSK: No peripheral edema.   Skin: No rashes or lesions noted.   : [id number] non-thrombosed external hemorrhoid present at 6:00 position. There is also an anal fissure at 12:00 position.  Psych: Appropriate affect. Mood is good

## 2024-02-13 NOTE — PATIENT INSTRUCTIONS
Apply nifedipine ointment rectally up to two times daily prior to bowel movements.    Bidet/sitz baths after bowel movements.    Use Tucks/preparahation H during the day.    Follow=up if no improvement or worsening.

## 2024-02-24 DIAGNOSIS — F41.8 POSTPARTUM ANXIETY: ICD-10-CM

## 2024-02-26 NOTE — TELEPHONE ENCOUNTER
Medication requested: sertraline (ZOLOFT) 50 MG tablet   Last office visit: 2/13/24  [facility] appointments: none  Medication last refilled: 9/1/23; 90 + 1 refill  Last qualifying labs:    9/1/2023  9:42 AM   PHQ-9 / CODY-7 Scores 8/2015 to present    CODY-7 Score DocFlow 6       9/1/2023  9:42 AM   PHQ-9 / CODY-7 Scores 8/2015 to present    PHQ-9 Score DocFlow 4      Prescription approved per [facility] Refill Protocol.    Devan GARCIA, RN  02/26/24 3:52 PM

## 2024-04-20 ENCOUNTER — HEALTH MAINTENANCE LETTER (OUTPATIENT)
Age: 39
End: 2024-04-20

## 2024-04-29 SDOH — HEALTH STABILITY: PHYSICAL HEALTH: ON AVERAGE, HOW MANY MINUTES DO YOU ENGAGE IN EXERCISE AT THIS LEVEL?: 30 MIN

## 2024-04-29 SDOH — HEALTH STABILITY: PHYSICAL HEALTH: ON AVERAGE, HOW MANY DAYS PER WEEK DO YOU ENGAGE IN MODERATE TO STRENUOUS EXERCISE (LIKE A BRISK WALK)?: 3 DAYS

## 2024-04-29 ASSESSMENT — SOCIAL DETERMINANTS OF HEALTH (SDOH): HOW OFTEN DO YOU GET TOGETHER WITH FRIENDS OR RELATIVES?: ONCE A WEEK

## 2024-05-03 ENCOUNTER — OFFICE VISIT (OUTPATIENT)
Dept: FAMILY MEDICINE | Facility: CLINIC | Age: 39
End: 2024-05-03
Payer: COMMERCIAL

## 2024-05-03 VITALS
HEART RATE: 91 BPM | BODY MASS INDEX: 32.61 KG/M2 | HEIGHT: 64 IN | OXYGEN SATURATION: 97 % | RESPIRATION RATE: 16 BRPM | WEIGHT: 191 LBS | SYSTOLIC BLOOD PRESSURE: 116 MMHG | TEMPERATURE: 98 F | DIASTOLIC BLOOD PRESSURE: 82 MMHG

## 2024-05-03 DIAGNOSIS — Z12.31 ENCOUNTER FOR SCREENING MAMMOGRAM FOR MALIGNANT NEOPLASM OF BREAST: ICD-10-CM

## 2024-05-03 DIAGNOSIS — Z00.00 ROUTINE GENERAL MEDICAL EXAMINATION AT A HEALTH CARE FACILITY: Primary | ICD-10-CM

## 2024-05-03 DIAGNOSIS — M79.674 PAIN OF TOE OF RIGHT FOOT: ICD-10-CM

## 2024-05-03 DIAGNOSIS — F41.8 POSTPARTUM ANXIETY: ICD-10-CM

## 2024-05-03 ASSESSMENT — ANXIETY QUESTIONNAIRES
1. FEELING NERVOUS, ANXIOUS, OR ON EDGE: NOT AT ALL
GAD7 TOTAL SCORE: 5
7. FEELING AFRAID AS IF SOMETHING AWFUL MIGHT HAPPEN: MORE THAN HALF THE DAYS
2. NOT BEING ABLE TO STOP OR CONTROL WORRYING: SEVERAL DAYS
3. WORRYING TOO MUCH ABOUT DIFFERENT THINGS: SEVERAL DAYS
GAD7 TOTAL SCORE: 5
6. BECOMING EASILY ANNOYED OR IRRITABLE: SEVERAL DAYS
5. BEING SO RESTLESS THAT IT IS HARD TO SIT STILL: NOT AT ALL

## 2024-05-03 ASSESSMENT — PATIENT HEALTH QUESTIONNAIRE - PHQ9
SUM OF ALL RESPONSES TO PHQ QUESTIONS 1-9: 5
5. POOR APPETITE OR OVEREATING: NOT AT ALL

## 2024-05-03 NOTE — PATIENT INSTRUCTIONS
Good to see you today!    Sertraline refill at your pharmacy.    [facility]ing [facility] - scar mobilization and sensitivity resources  [facility] - home based PT - could include scar mobilization with this ([email or web address]    [facility]  [address] [place] [address]  [phone]    Lab/X-ray hours:  M-F 7:00AM - 7:00PM  Sat 9:00AM - 1:00PM  Sun - Closed   Preventive Care Advice   This is general advice given by our system to help you stay healthy. However, your care team may have specific advice just for you. Please talk to your care team about your preventive care needs.  Nutrition  Eat 5 or more servings of fruits and vegetables each day.  Try wheat bread, brown rice and whole grain pasta (instead of white bread, rice, and pasta).  Get enough calcium and vitamin D. Check the label on foods and aim for 100% of the RDA (recommended daily allowance).  Lifestyle  Exercise at least 150 minutes each week   (30 minutes a day, 5 days a week).  Do muscle strengthening activities 2 days a week. These help control your weight and prevent disease.  No smoking.  Wear sunscreen to prevent skin cancer.  Have a dental exam and cleaning every 6 months.  Yearly exams  See your health care team every year to talk about:  Any changes in your health.  Any medicines your care team has prescribed.  Preventive care, family planning, and ways to prevent chronic diseases.  Shots (vaccines)   HPV shots (up to age 26), if you've never had them before.  Hepatitis B shots (up to age 59), if you've never had them before.  COVID-19 shot: Get this shot when it's due.  Flu shot: Get a flu shot every year.  Tetanus shot: Get a tetanus shot every 10 years.  Pneumococcal, hepatitis A, and RSV shots: Ask your care team if you need these based on your risk.  Shingles shot (for age 50 and up).  General health tests  Diabetes screening:  Starting at age 35, Get screened for diabetes at least every 3 years.  If you are younger than  age 35, ask your care team if you should be screened for diabetes.  Cholesterol test: At age 39, start having a cholesterol test every 5 years, or more often if advised.  Bone density scan (DEXA): At age 50, ask your care team if you should have this scan for osteoporosis (brittle bones).  Hepatitis C: Get tested at least once in your life.  STIs (sexually transmitted infections)  Before age 24: Ask your care team if you should be screened for STIs.  After age 24: Get screened for STIs if you're at risk. You are at risk for STIs (including HIV) if:  You are sexually active with more than one person.  You don't use condoms every time.  You or a partner was diagnosed with a sexually transmitted infection.  If you are at risk for HIV, ask about PrEP medicine to prevent HIV.  Get tested for HIV at least once in your life, whether you are at risk for HIV or not.  Cancer screening tests  Cervical cancer screening: If you have a cervix, begin getting regular cervical cancer screening tests at age 21. Most people who have regular screenings with normal results can stop after age 65. Talk about this with your provider.  Breast cancer scan (mammogram): If you've ever had breasts, begin having regular mammograms starting at age 40. This is a scan to check for breast cancer.  Colon cancer screening: It is important to start screening for colon cancer at age 45.  Have a colonoscopy test every 10 years (or more often if you're at risk) Or, ask your provider about stool tests like a FIT test every year or Cologuard test every 3 years.  To learn more about your testing options, visit: https://www.[employer]/342217.pdf.  For help making a decision, visit: https://bit.ly/oo66576.  Prostate cancer screening test: If you have a prostate and are age 55 to 69, ask your provider if you would benefit from a yearly prostate cancer screening test.  Lung cancer screening: If you are a current or former smoker age 50 to 80, ask your care team  if ongoing lung cancer screenings are right for you.  For informational purposes only. Not to replace the advice of your health care provider. Copyright   2023 [facility] [employer]. All rights reserved. Clinically reviewed by the [facility] Transitions Program. [employer] 122136 - REV 01/24.    Learning About Stress  What is stress?     Stress is your body's response to a hard situation. Your body can have a physical, emotional, or mental response. Stress is a fact of life for most people, and it affects everyone differently. What causes stress for you may not be stressful for someone else.  A lot of things can cause stress. You may feel stress when you go on a job interview, take a test, or run a race. This kind of short-term stress is normal and even useful. It can help you if you need to work hard or react quickly. For example, stress can help you finish an important job on time.  Long-term stress is caused by ongoing stressful situations or events. Examples of long-term stress include long-term health problems, ongoing problems at work, or conflicts in your family. Long-term stress can harm your health.  How does stress affect your health?  When you are stressed, your body responds as though you are in danger. It makes hormones that speed up your heart, make you breathe faster, and give you a burst of energy. This is called the fight-or-flight stress response. If the stress is over quickly, your body goes back to normal and no harm is done.  But if stress happens too often or lasts too long, it can have bad effects. Long-term stress can make you more likely to get sick, and it can make symptoms of some diseases worse. If you tense up when you are stressed, you may develop neck, shoulder, or low back pain. Stress is linked to high blood pressure and heart disease.  Stress also harms your emotional health. It can make you simon, tense, or depressed. Your relationships may suffer, and you may not do well  at work or school.  What can you do to manage stress?  You can try these things to help manage stress:   Do something active. Exercise or activity can help reduce stress. Walking is a great way to get started. Even everyday activities such as housecleaning or yard work can help.  Try yoga or rashel chi. These techniques combine exercise and meditation. You may need some training at first to learn them.  Do something you enjoy. For example, listen to music or go to a movie. Practice your hobby or do volunteer work.  Meditate. This can help you relax, because you are not worrying about what happened before or what may happen in the future.  Do guided imagery. Imagine yourself in any setting that helps you feel calm. You can use online videos, books, or a teacher to guide you.  Do breathing exercises. For example:  From a standing position, bend forward from the waist with your knees slightly bent. Let your arms dangle close to the floor.  Breathe in slowly and deeply as you return to a standing position. Roll up slowly and lift your head last.  Hold your breath for just a few seconds in the standing position.  Breathe out slowly and bend forward from the waist.  Let your feelings out. Talk, laugh, cry, and express anger when you need to. Talking with supportive friends or family, a counselor, or a rema leader about your feelings is a healthy way to relieve stress. Avoid discussing your feelings with people who make you feel worse.  Write. It may help to write about things that are bothering you. This helps you find out how much stress you feel and what is causing it. When you know this, you can find better ways to cope.  What can you do to prevent stress?  You might try some of these things to help prevent stress:  Manage your time. This helps you find time to do the things you want and need to do.  Get enough sleep. Your body recovers from the stresses of the day while you are sleeping.  Get support. Your family,  "friends, and community can make a difference in how you experience stress.  Limit your news feed. Avoid or limit time on social media or news that may make you feel stressed.  Do something active. Exercise or activity can help reduce stress. Walking is a great way to get started.  Where can you learn more?  Go to https://www.[employer].net/patiented  Enter N032 in the search box to learn more about \"Learning About Stress.\"  Current as of: October 24, 2023               Content Version: 14.0    4138-4475 [employer].   Care instructions adapted under license by your healthcare professional. If you have questions about a medical condition or this instruction, always ask your healthcare professional. [employer] disclaims any warranty or liability for your use of this information.      "

## 2024-05-03 NOTE — PROGRESS NOTES
Preventive Care Visit  [facility]  Sherine Villarreal MD, Family Medicine  May 3, 2024      Assessment & Plan     Michelle was seen today for physical.    Diagnoses and all orders for this visit:    Routine general medical examination at a health care facility    Postpartum anxiety  -     sertraline (ZOLOFT) 50 MG tablet; Take 1 tablet (50 mg) by mouth daily    Encounter for screening mammogram for malignant neoplasm of breast  -     Mammogram - routine screening; Future    Pain of toe of right foot  -     XR Toe Right G/E 2 Views; Future        Continue sertraline  Consider physical therapy for scar tissue mobilization - will contact our office for referral if needed  X-ray of toe - consider podiatry if worsening      Counseling  Appropriate preventive services were discussed with this patient, including applicable screening as appropriate for fall prevention, nutrition, physical activity, Tobacco-use cessation, weight loss and cognition.  Checklist reviewing preventive services available has been given to the patient.  Reviewed patient's diet, addressing concerns and/or questions.   She is at risk for lack of exercise and has been provided with information to increase physical activity for the benefit of her well-being.   The patient was instructed to see the dentist every 6 months.   The patient reports drinking more than one alcoholic drink per day and sometimes engages in binge or excessive drinking. The patient was counseled and given information about possible harmful effects of excessive alcohol intake as well as where to get help for alcohol problems. The patient's PHQ-9 score is consistent with mild depression. She was provided with information regarding depression.       Return in about 53 weeks (around 5/9/2025) for Annual Wellness Visit.    Subjective   Michelle is a 38 year old, presenting for the following:  Physical      Health Care Directive  Patient does not have a Health Care Directive or Living  Will: Discussed advance care planning with patient; information given to patient to review.    HPI    Starting night weaning this week - has had some challenges with this    Gynecological history:  Menstrual/PMS/menopausal symptoms: regular  Last Pap:  2021, result Normal  History of abnormal Paps: no  Would you like to become pregnant in the next year? no   Are you currently breastfeeding? yes    Other health-related habits:  Physical activity:  yoga 1x/week - strength. Improving core strength. Working with chiropractor on strength training exercises too. Walk/run - working to get HR up to increase endorphins. Goal is 3x/week  Mood: generally stable. Getting back into hobbies.     Toe pain: noticed that her left toe is able to flex a lot less well and develops pain on the top of the foot with yoga. This has been getting worse over the past several months.    Health Maintenance Due   Topic Date Due    ADVANCE CARE PLANNING  Never done    GLUCOSE  Never done           4/29/2024   General Health   How would you rate your overall physical health? Good   Feel stress (tense, anxious, or unable to sleep) To some extent   (!) STRESS CONCERN      4/29/2024   Nutrition   Three or more servings of calcium each day? Yes   Diet: Regular (no restrictions)   How many servings of fruit and vegetables per day? 4 or more   How many sweetened beverages each day? 0-1         4/29/2024   Exercise   Days per week of moderate/strenous exercise 3 days   Average minutes spent exercising at this level 30 min         4/29/2024   Social Factors   Frequency of gathering with friends or relatives Once a week   Worry food won't last until get money to buy more No   Food not last or not have enough money for food? No   Do you have housing?  Yes   Are you worried about losing your housing? No   Lack of transportation? No   Unable to get utilities (heat,electricity)? No         4/29/2024   Dental   Dentist two times every year? (!) NO          4/29/2024   TB Screening   Were you born outside of the [place]? No       Today's PHQ-2 Score:       5/3/2024     3:50 PM   PHQ-2 ( 1999 [employer])   Q1: Little interest or pleasure in doing things 0   Q2: Feeling down, depressed or hopeless 1   PHQ-2 Score 1         4/29/2024   Substance Use   Alcohol more than 3/day or more than 7/wk Yes   How often do you have a drink containing alcohol 4 or more times a week   How many alcohol drinks on typical day 3 or 4   How often do you have 5+ drinks at one occasion Less than monthly   Audit 2/3 Score 2   How often not able to stop drinking once started Monthly   How often failed to do what normally expected Never   How often needed first drink in am after a heavy drinking session Never   How often feeling of guilt or remorse after drinking Monthly   How often unable to remember what happened the night before Less than monthly   Have you or someone else been injured because of your drinking No   Has anyone been concerned or suggested you cut down on drinking No   TOTAL SCORE - AUDIT 11   Do you use any other substances recreationally? (!) ALCOHOL     Social History     Tobacco Use    Smoking status: Never    Smokeless tobacco: Never   Substance Use Topics    Alcohol use: Yes     Comment:  3-6 glasses of beer per week.    Drug use: No     Mammogram Screening - Mammogram every 1-2 years updated in Health Maintenance based on mutual decision making        4/29/2024   STI Screening   New sexual partner(s) since last STI/HIV test? No     History of abnormal Pap smear: NO - age 30-65 PAP every 5 years with negative HPV co-testing recommended        9/15/2021    10:52 AM   PAP / HPV   PAP-ABSTRACT See Scanned Document           This result is from an external source.           4/29/2024   Contraception/Family Planning   Questions about contraception or family planning No       Reviewed and updated as needed this visit by Provider   Tobacco  Allergies  Meds  Problems  Med Hx  Surg Hx  " Fam Hx     Sexual Activity          Objective    Exam  /82 (BP Location: Left arm, Patient Position: Sitting, Cuff Size: Adult Large)   Pulse 91   Temp 98  F (36.7  C) (Temporal)   Resp 16   Ht 1.613 m (5' 3.5\")   Wt 86.6 kg (191 lb)   LMP 04/19/2024   SpO2 97%   BMI 33.30 kg/m     Estimated body mass index is 33.3 kg/m  as calculated from the following:    Height as of this encounter: 1.613 m (5' 3.5\").    Weight as of this encounter: 86.6 kg (191 lb).    Physical Exam  GENERAL: alert and no distress  EYES: Eyes grossly normal to inspection, PERRL and conjunctivae and sclerae normal  HENT: ear canals and TM's normal, nose and mouth without ulcers or lesions  NECK: no adenopathy, no asymmetry, masses, or scars  RESP: lungs clear to auscultation - no rales, rhonchi or wheezes  CV: regular rate and rhythm, normal S1 S2, no S3 or S4, no murmur, click or rub, no peripheral edema  ABDOMEN: soft, nontender, no hepatosplenomegaly, no masses and bowel sounds normal  MS: no gross musculoskeletal defects noted, no edema  SKIN: no suspicious lesions or rashes  NEURO: Normal strength and tone, mentation intact and speech normal  PSYCH: mentation appears normal, affect normal/bright        Signed Electronically by: Sherine Villarreal MD          "

## 2024-05-03 NOTE — Clinical Note
"Figueroa Mcginnis, Question for you - I saw Michelle today. We were talking more about mental health and she talked today about how she grew up in a very Taoist, almost cult-like background (\"if my mom was late coming home I'd fear she was taken by the rapture\"). She talked about how re-parenting for her 1 y/o has been difficult, and she doesn't feel like she is making progress on this trauma history with her current therapist. Do you know anyone doing therapy work with folks coming out from extreme Taoist backgrounds? Maybe a PTSD approach? Or family based therapy? Just wanted to see if you had any thoughts/ideas to recommend to her about this. Thanks! Sherine"

## 2024-05-03 NOTE — NURSING NOTE
"38 year old  Chief Complaint   Patient presents with    Physical       Blood pressure 116/82, pulse 91, temperature 98  F (36.7  C), temperature source Temporal, resp. rate 16, height 1.613 m (5' 3.5\"), weight 86.6 kg (191 lb), last menstrual period 2024, SpO2 97%. Body mass index is 33.3 kg/m .  Patient Active Problem List   Diagnosis    Postpartum anxiety       Wt Readings from Last 2 Encounters:   24 86.6 kg (191 lb)   23 84.6 kg (186 lb 8 oz)     BP Readings from Last 3 Encounters:   24 116/82   24 127/78   23 115/80         Current Outpatient Medications   Medication Sig Dispense Refill    desonide (DESOWEN) 0.05 % external cream Apply topically daily as needed 15 g 0    fish oil-omega-3 fatty acids 1000 MG capsule Take 2 g by mouth daily      nifedipine 0.2% in white petrolatum 0.2 % OINT ointment Apply topically 2 times daily 30 g 1    Prenatal Multivit-Min-Fe-FA (JENLIVA PRENATAL/ PO)       sertraline (ZOLOFT) 50 MG tablet TAKE 1 TABLET BY MOUTH EVERY DAY 90 tablet 0     No current facility-administered medications for this visit.       Social History     Tobacco Use    Smoking status: Never    Smokeless tobacco: Never   Substance Use Topics    Alcohol use: Yes     Comment:  3-6 glasses of beer per week.    Drug use: No       Health Maintenance Due   Topic Date Due    ADVANCE CARE PLANNING  Never done    GLUCOSE  Never done    YEARLY PREVENTIVE VISIT  2024       No results found for: \"PAP\"      May 3, 2024 3:49 PM    "

## 2024-06-29 ENCOUNTER — HEALTH MAINTENANCE LETTER (OUTPATIENT)
Age: 39
End: 2024-06-29

## 2024-07-02 ENCOUNTER — ANCILLARY PROCEDURE (OUTPATIENT)
Dept: MAMMOGRAPHY | Facility: CLINIC | Age: 39
End: 2024-07-02
Attending: FAMILY MEDICINE
Payer: COMMERCIAL

## 2024-07-02 DIAGNOSIS — Z12.31 VISIT FOR SCREENING MAMMOGRAM: ICD-10-CM

## 2024-07-02 PROCEDURE — 77063 BREAST TOMOSYNTHESIS BI: CPT

## 2024-07-02 PROCEDURE — 77067 SCR MAMMO BI INCL CAD: CPT | Mod: 26

## 2024-07-02 PROCEDURE — 77063 BREAST TOMOSYNTHESIS BI: CPT | Mod: 26

## 2024-07-03 ENCOUNTER — ANCILLARY ORDERS (OUTPATIENT)
Dept: FAMILY MEDICINE | Facility: CLINIC | Age: 39
End: 2024-07-03

## 2024-07-03 DIAGNOSIS — R92.8 ABNORMAL MAMMOGRAM OF RIGHT BREAST: Primary | ICD-10-CM

## 2024-07-12 ENCOUNTER — ANCILLARY PROCEDURE (OUTPATIENT)
Dept: MAMMOGRAPHY | Facility: CLINIC | Age: 39
End: 2024-07-12
Attending: FAMILY MEDICINE
Payer: COMMERCIAL

## 2024-07-12 DIAGNOSIS — R92.8 ABNORMAL MAMMOGRAM OF RIGHT BREAST: ICD-10-CM

## 2024-07-12 PROCEDURE — 76642 ULTRASOUND BREAST LIMITED: CPT | Mod: RT | Performed by: STUDENT IN AN ORGANIZED HEALTH CARE EDUCATION/TRAINING PROGRAM

## 2024-07-12 PROCEDURE — G0279 TOMOSYNTHESIS, MAMMO: HCPCS | Performed by: STUDENT IN AN ORGANIZED HEALTH CARE EDUCATION/TRAINING PROGRAM

## 2024-07-12 PROCEDURE — 77065 DX MAMMO INCL CAD UNI: CPT | Mod: RT | Performed by: STUDENT IN AN ORGANIZED HEALTH CARE EDUCATION/TRAINING PROGRAM

## 2024-12-10 ENCOUNTER — TRANSFERRED RECORDS (OUTPATIENT)
Dept: HEALTH INFORMATION MANAGEMENT | Facility: CLINIC | Age: 39
End: 2024-12-10
Payer: COMMERCIAL

## 2025-01-06 ENCOUNTER — MYC MEDICAL ADVICE (OUTPATIENT)
Dept: FAMILY MEDICINE | Facility: CLINIC | Age: 40
End: 2025-01-06

## 2025-01-06 DIAGNOSIS — F41.8 POSTPARTUM ANXIETY: ICD-10-CM

## 2025-01-07 NOTE — TELEPHONE ENCOUNTER
Medication requested: sertraline (ZOLOFT) 50 MG tablet   Last office visit: 5/3/2024  [facility] appointments: none  Medication last refilled: 5/3/2024; 90 tabs + 3 refills  Last qualifying labs:      5/3/2024  3:50 PM   PHQ-9 and GAD7 Scores    PHQ-9 Total Score 5    CODY-7 Total Score 5      Routing refill request to provider for review/approval because:  Pt reports she is currently taking 1.5 tabs daily and will run out of medication before next refill.    JOSE Willoughby, RN  01/07/25, 9:41 AM

## 2025-02-24 ENCOUNTER — ANCILLARY PROCEDURE (OUTPATIENT)
Dept: GENERAL RADIOLOGY | Facility: CLINIC | Age: 40
End: 2025-02-24
Attending: FAMILY MEDICINE
Payer: COMMERCIAL

## 2025-02-24 DIAGNOSIS — M79.674 PAIN OF TOE OF RIGHT FOOT: ICD-10-CM

## 2025-02-24 PROCEDURE — 73660 X-RAY EXAM OF TOE(S): CPT | Mod: RT | Performed by: RADIOLOGY

## 2025-06-22 ENCOUNTER — HEALTH MAINTENANCE LETTER (OUTPATIENT)
Age: 40
End: 2025-06-22

## 2025-07-15 ENCOUNTER — OFFICE VISIT (OUTPATIENT)
Dept: FAMILY MEDICINE | Facility: CLINIC | Age: 40
End: 2025-07-15
Payer: COMMERCIAL

## 2025-07-15 VITALS
SYSTOLIC BLOOD PRESSURE: 116 MMHG | WEIGHT: 188 LBS | HEIGHT: 63 IN | DIASTOLIC BLOOD PRESSURE: 87 MMHG | RESPIRATION RATE: 16 BRPM | TEMPERATURE: 97.2 F | OXYGEN SATURATION: 97 % | BODY MASS INDEX: 33.31 KG/M2 | HEART RATE: 97 BPM

## 2025-07-15 DIAGNOSIS — Z13.0 SCREENING, IRON DEFICIENCY ANEMIA: ICD-10-CM

## 2025-07-15 DIAGNOSIS — Z00.00 ROUTINE GENERAL MEDICAL EXAMINATION AT A HEALTH CARE FACILITY: Primary | ICD-10-CM

## 2025-07-15 DIAGNOSIS — Z13.1 SCREENING FOR DIABETES MELLITUS (DM): ICD-10-CM

## 2025-07-15 DIAGNOSIS — Z12.31 ENCOUNTER FOR SCREENING MAMMOGRAM FOR MALIGNANT NEOPLASM OF BREAST: ICD-10-CM

## 2025-07-15 DIAGNOSIS — F41.8 POSTPARTUM ANXIETY: ICD-10-CM

## 2025-07-15 DIAGNOSIS — Z13.220 SCREENING FOR HYPERLIPIDEMIA: ICD-10-CM

## 2025-07-15 LAB
CHOLEST SERPL-MCNC: 210 MG/DL
EST. AVERAGE GLUCOSE BLD GHB EST-MCNC: 97 MG/DL
FASTING STATUS PATIENT QL REPORTED: NO
FERRITIN SERPL-MCNC: 166 NG/ML (ref 6–175)
HBA1C MFR BLD: 5 % (ref 0–5.6)
HDLC SERPL-MCNC: 65 MG/DL
HGB BLD-MCNC: 14.5 G/DL (ref 11.7–15.7)
LDLC SERPL CALC-MCNC: 97 MG/DL
MCV RBC AUTO: 94 FL (ref 78–100)
NONHDLC SERPL-MCNC: 145 MG/DL
TRIGL SERPL-MCNC: 241 MG/DL

## 2025-07-15 PROCEDURE — 82728 ASSAY OF FERRITIN: CPT | Performed by: FAMILY MEDICINE

## 2025-07-15 PROCEDURE — 82465 ASSAY BLD/SERUM CHOLESTEROL: CPT | Performed by: FAMILY MEDICINE

## 2025-07-15 SDOH — HEALTH STABILITY: PHYSICAL HEALTH: ON AVERAGE, HOW MANY DAYS PER WEEK DO YOU ENGAGE IN MODERATE TO STRENUOUS EXERCISE (LIKE A BRISK WALK)?: 4 DAYS

## 2025-07-15 ASSESSMENT — ANXIETY QUESTIONNAIRES
7. FEELING AFRAID AS IF SOMETHING AWFUL MIGHT HAPPEN: MORE THAN HALF THE DAYS
8. IF YOU CHECKED OFF ANY PROBLEMS, HOW DIFFICULT HAVE THESE MADE IT FOR YOU TO DO YOUR WORK, TAKE CARE OF THINGS AT HOME, OR GET ALONG WITH OTHER PEOPLE?: NOT DIFFICULT AT ALL
GAD7 TOTAL SCORE: 7
1. FEELING NERVOUS, ANXIOUS, OR ON EDGE: SEVERAL DAYS
GAD7 TOTAL SCORE: 7
IF YOU CHECKED OFF ANY PROBLEMS ON THIS QUESTIONNAIRE, HOW DIFFICULT HAVE THESE PROBLEMS MADE IT FOR YOU TO DO YOUR WORK, TAKE CARE OF THINGS AT HOME, OR GET ALONG WITH OTHER PEOPLE: NOT DIFFICULT AT ALL
4. TROUBLE RELAXING: NOT AT ALL
2. NOT BEING ABLE TO STOP OR CONTROL WORRYING: MORE THAN HALF THE DAYS
5. BEING SO RESTLESS THAT IT IS HARD TO SIT STILL: NOT AT ALL
6. BECOMING EASILY ANNOYED OR IRRITABLE: NOT AT ALL
7. FEELING AFRAID AS IF SOMETHING AWFUL MIGHT HAPPEN: MORE THAN HALF THE DAYS
GAD7 TOTAL SCORE: 7
3. WORRYING TOO MUCH ABOUT DIFFERENT THINGS: MORE THAN HALF THE DAYS

## 2025-07-15 ASSESSMENT — PATIENT HEALTH QUESTIONNAIRE - PHQ9
10. IF YOU CHECKED OFF ANY PROBLEMS, HOW DIFFICULT HAVE THESE PROBLEMS MADE IT FOR YOU TO DO YOUR WORK, TAKE CARE OF THINGS AT HOME, OR GET ALONG WITH OTHER PEOPLE: NOT DIFFICULT AT ALL
SUM OF ALL RESPONSES TO PHQ QUESTIONS 1-9: 5
SUM OF ALL RESPONSES TO PHQ QUESTIONS 1-9: 5

## 2025-07-15 ASSESSMENT — SOCIAL DETERMINANTS OF HEALTH (SDOH): HOW OFTEN DO YOU GET TOGETHER WITH FRIENDS OR RELATIVES?: ONCE A WEEK

## 2025-07-15 NOTE — PATIENT INSTRUCTIONS
"Good to see you today!    We will draw lab tests today. I will contact you in the next 2-3 business days with the results of these labs.    Continue sertraline - let me know if any adjustments/changes are needed.    To schedule your imaging study (mammogram), please call [phone] and say \"Imaging\" at the main menu to be directed to centralized imaging scheduling.    Closest location for imaging is:  [facility] [place] [id number]     Patient Education   Preventive Care Advice   This is general advice given by our system to help you stay healthy. However, your care team may have specific advice just for you. Please talk to your care team about your preventive care needs.  Nutrition  Eat 5 or more servings of fruits and vegetables each day.  Try wheat bread, brown rice and whole grain pasta (instead of white bread, rice, and pasta).  Get enough calcium and vitamin D. Check the label on foods and aim for 100% of the RDA (recommended daily allowance).  Lifestyle  Exercise at least 150 minutes each week  (30 minutes a day, 5 days a week).  Do muscle strengthening activities 2 days a week. These help control your weight and prevent disease.  No smoking.  Wear sunscreen to prevent skin cancer.  Have a dental exam and cleaning every 6 months.  Yearly exams  See your health care team every year to talk about:  Any changes in your health.  Any medicines your care team has prescribed.  Preventive care, family planning, and ways to prevent chronic diseases.  Shots (vaccines)   HPV shots (up to age 26), if you've never had them before.  Hepatitis B shots (up to age 59), if you've never had them before.  COVID-19 shot: Get this shot when it's due.  Flu shot: Get a flu shot every year.  Tetanus shot: Get a tetanus shot every 10 years.  Pneumococcal, hepatitis A, and RSV shots: Ask your care team if you need these based on your risk.  Shingles shot (for age 50 and up)  General health " tests  Diabetes screening:  Starting at age 35, Get screened for diabetes at least every 3 years.  If you are younger than age 35, ask your care team if you should be screened for diabetes.  Cholesterol test: At age 39, start having a cholesterol test every 5 years, or more often if advised.  Bone density scan (DEXA): At age 50, ask your care team if you should have this scan for osteoporosis (brittle bones).  Hepatitis C: Get tested at least once in your life.  STIs (sexually transmitted infections)  Before age 24: Ask your care team if you should be screened for STIs.  After age 24: Get screened for STIs if you're at risk. You are at risk for STIs (including HIV) if:  You are sexually active with more than one person.  You don't use condoms every time.  You or a partner was diagnosed with a sexually transmitted infection.  If you are at risk for HIV, ask about PrEP medicine to prevent HIV.  Get tested for HIV at least once in your life, whether you are at risk for HIV or not.  Cancer screening tests  Cervical cancer screening: If you have a cervix, begin getting regular cervical cancer screening tests starting at age 21.  Breast cancer scan (mammogram): If you've ever had breasts, begin having regular mammograms starting at age 40. This is a scan to check for breast cancer.  Colon cancer screening: It is important to start screening for colon cancer at age 45.  Have a colonoscopy test every 10 years (or more often if you're at risk) Or, ask your provider about stool tests like a FIT test every year or Cologuard test every 3 years.  To learn more about your testing options, visit:   .  For help making a decision, visit:   https://bit.ly/mn26212.  Prostate cancer screening test: If you have a prostate, ask your care team if a prostate cancer screening test (PSA) at age 55 is right for you.  Lung cancer screening: If you are a current or former smoker ages 50 to 80, ask your care team if ongoing lung cancer  screenings are right for you.  For informational purposes only. Not to replace the advice of your health care provider. Copyright   2023 [facility]. All rights reserved. Clinically reviewed by the [facility] Transitions Program. [employer] 145211 - REV 01/24.

## 2025-07-15 NOTE — PROGRESS NOTES
Preventive Care Visit  [facility]  Sherine Villarreal MD, Family Medicine  Jul 15, 2025      Assessment & Plan     Michelle was seen today for physical.    Diagnoses and all orders for this visit:    Routine general medical examination at a health care facility    Postpartum anxiety  -     sertraline (ZOLOFT) 50 MG tablet; Take 1.5 tablets (75 mg) by mouth daily.    Screening for hyperlipidemia  -     Lipid panel reflex to direct LDL Non-fasting; Future    Screening for diabetes mellitus (DM)  -     Hemoglobin A1c; Future    Screening, iron deficiency anemia  -     Hemoglobin; Future  -     Ferritin; Future    Encounter for screening mammogram for malignant neoplasm of breast  -     MA Screening Bilateral w/ Catrachito; Future      Update screening labs  Continue sertraline    Follow-up in 1 year, sooner with concerns.      Counseling  Appropriate preventive services were addressed with this patient via screening, questionnaire, or discussion as appropriate for fall prevention, nutrition, physical activity, Tobacco-use cessation, social engagement, weight loss and cognition.  Checklist reviewing preventive services available has been given to the patient.  Reviewed patient's diet, addressing concerns and/or questions.   The patient was instructed to see the dentist every 6 months.   The patient reports drinking more than 3 alcoholic drinks per day and/or more than 7 drhnks per week. The patient was counseled and given information about possible harmful effects of excessive alcohol intake.The patient's PHQ-9 score is consistent with mild depression. She was provided with information regarding depression.       Subjective   Michelle is a 40 year old, presenting for the following:  Physical         HPI    Gynecological history:  Menstrual/PMS/menopausal symptoms: regular  Last Pap:  2021, result Normal  History of abnormal Paps: no  Would you like to become pregnant in the next year? no   Contraceptive method:  "abstinence  Are you currently breastfeeding? no    Other health-related habits:  Physical activity:  active with child, doesn't \"work out\" but walking and activities regularly  Mood: currently on sertraline 75 mg  Had a few days several months ago where she was out of meds x 4 days    Split from  but all living together    Health Maintenance Due   Topic Date Due    LIPID  Never done    MAMMO SCREENING  07/12/2025     Advance Care Planning  Discussed advance care planning with patient; informed AVS has link to [facility] [employer].        7/15/2025   General Health   How would you rate your overall physical health? (!) FAIR   Feel stress (tense, anxious, or unable to sleep) Not at all         7/15/2025   Nutrition   Three or more servings of calcium each day? Yes   Diet: Regular (no restrictions)   How many servings of fruit and vegetables per day? (!) 2-3   How many sweetened beverages each day? (!) 2         7/15/2025   Exercise   Days per week of moderate/strenous exercise 4 days         7/15/2025   Social Factors   Frequency of gathering with friends or relatives Once a week   Worry food won't last until get money to buy more No   Food not last or not have enough money for food? No   Do you have housing? (Housing is defined as stable permanent housing and does not include staying outside in a car, in a tent, in an abandoned building, in an overnight shelter, or couch-surfing.) Yes   Are you worried about losing your housing? No   Lack of transportation? No   Unable to get utilities (heat,electricity)? No         7/15/2025   Dental   Dentist two times every year? (!) NO       Today's PHQ-9 Score:       7/15/2025     9:32 AM   PHQ-9 SCORE   PHQ-9 Total Score MyChart 5 (Mild depression)   PHQ-9 Total Score 5        Patient-reported         7/15/2025   Substance Use   Alcohol more than 3/day or more than 7/wk Yes   How often do you have a drink containing alcohol 4 or more times a week   How many alcohol drinks on " typical day 3 or 4   How often do you have 5+ drinks at one occasion Monthly   Audit 2/3 Score 3   How often not able to stop drinking once started Monthly   How often failed to do what normally expected Never   How often needed first drink in am after a heavy drinking session Never   How often feeling of guilt or remorse after drinking Monthly   How often unable to remember what happened the night before Monthly   Have you or someone else been injured because of your drinking No   Has anyone been concerned or suggested you cut down on drinking No   TOTAL SCORE - AUDIT 13   Do you use any other substances recreationally? No     Social History     Tobacco Use    Smoking status: Never    Smokeless tobacco: Never   Substance Use Topics    Alcohol use: Yes     Comment: 2-3/day    Drug use: No           7/2/2024   LAST FHS-7 RESULTS   1st degree relative breast or ovarian cancer No   Any relative bilateral breast cancer No   Any male have breast cancer No   Any ONE woman have BOTH breast AND ovarian cancer No   Any woman with breast cancer before 50yrs No   2 or more relatives with breast AND/OR ovarian cancer No   2 or more relatives with breast AND/OR bowel cancer No     Mammogram Screening - Patient under 40 years of age: Routine Mammogram Screening not recommended.         7/15/2025   STI Screening   New sexual partner(s) since last STI/HIV test? No     History of abnormal Pap smear: No - age 30- 64 PAP with HPV every 5 years recommended        9/15/2021    10:52 AM   PAP / HPV   PAP-ABSTRACT See Scanned Document      ASCVD Risk   The ASCVD Risk score (Capo HERNANDEZ, et al., 2019) failed to calculate for the following reasons:    Cannot find a previous HDL lab    Cannot find a previous total cholesterol lab        7/15/2025   Contraception/Family Planning   Questions about contraception or family planning No   What are your periods like? Regular       Reviewed and updated as needed this visit by Provider    "Tobacco  Allergies  Meds  Problems  Med Hx  Surg Hx  Fam Hx  Soc   Hx Sexual Activity             Objective    Exam  /87 (BP Location: Left arm, Patient Position: Sitting, Cuff Size: Adult Large)   Pulse 97   Temp 97.2  F (36.2  C) (Temporal)   Resp 16   Ht 1.61 m (5' 3.39\")   Wt 85.3 kg (188 lb)   LMP 06/26/2025   SpO2 97%   BMI 32.90 kg/m     Estimated body mass index is 32.9 kg/m  as calculated from the following:    Height as of this encounter: 1.61 m (5' 3.39\").    Weight as of this encounter: 85.3 kg (188 lb).    Physical Exam  GENERAL: alert and no distress  EYES: Eyes grossly normal to inspection, PERRL and conjunctivae and sclerae normal  HENT: ear canals and TM's normal, nose and mouth without ulcers or lesions  NECK: no adenopathy, no asymmetry, masses, or scars  RESP: lungs clear to auscultation - no rales, rhonchi or wheezes  CV: regular rate and rhythm, normal S1 S2, no S3 or S4, no murmur, click or rub, no peripheral edema  ABDOMEN: soft, nontender, no hepatosplenomegaly, no masses and bowel sounds normal  MS: no gross musculoskeletal defects noted, no edema  SKIN: no suspicious lesions or rashes  NEURO: Normal strength and tone, mentation intact and speech normal  PSYCH: mentation appears normal, affect normal/bright        Signed Electronically by: Sherine Villarreal MD    "

## 2025-07-15 NOTE — NURSING NOTE
"40 year old  Chief Complaint   Patient presents with    Physical       Blood pressure 116/87, pulse 97, temperature 97.2  F (36.2  C), temperature source Temporal, resp. rate 16, height 1.61 m (5' 3.39\"), weight 85.3 kg (188 lb), last menstrual period 2025, SpO2 97%. Body mass index is 32.9 kg/m .  Patient Active Problem List   Diagnosis    Postpartum anxiety       Wt Readings from Last 2 Encounters:   07/15/25 85.3 kg (188 lb)   24 86.6 kg (191 lb)     BP Readings from Last 3 Encounters:   07/15/25 116/87   24 116/82   24 127/78         Current Outpatient Medications   Medication Sig Dispense Refill    desonide (DESOWEN) 0.05 % external cream Apply topically daily as needed 15 g 0    fish oil-omega-3 fatty acids 1000 MG capsule Take 2 g by mouth daily      Prenatal Multivit-Min-Fe-FA (JENLIVA PRENATAL/ PO)       sertraline (ZOLOFT) 50 MG tablet Take 1.5 tablets (75 mg) by mouth daily. 135 tablet 1    sertraline (ZOLOFT) 50 MG tablet Take 1 tablet (50 mg) by mouth daily (Patient not taking: Reported on 7/15/2025) 90 tablet 3     No current facility-administered medications for this visit.       Social History     Tobacco Use    Smoking status: Never    Smokeless tobacco: Never   Substance Use Topics    Alcohol use: Yes     Comment:  3-6 glasses of beer per week.    Drug use: No       Health Maintenance Due   Topic Date Due    LIPID  Never done    YEARLY PREVENTIVE VISIT  2025       No results found for: \"PAP\"      July 15, 2025 9:45 AM    "

## 2025-07-16 ENCOUNTER — PATIENT OUTREACH (OUTPATIENT)
Dept: CARE COORDINATION | Facility: CLINIC | Age: 40
End: 2025-07-16
Payer: COMMERCIAL